# Patient Record
Sex: FEMALE | Race: WHITE | NOT HISPANIC OR LATINO | Employment: UNEMPLOYED | URBAN - METROPOLITAN AREA
[De-identification: names, ages, dates, MRNs, and addresses within clinical notes are randomized per-mention and may not be internally consistent; named-entity substitution may affect disease eponyms.]

---

## 2022-01-31 ENCOUNTER — HOSPITAL ENCOUNTER (EMERGENCY)
Facility: HOSPITAL | Age: 57
Discharge: HOME | End: 2022-01-31
Attending: EMERGENCY MEDICINE | Admitting: EMERGENCY MEDICINE
Payer: COMMERCIAL

## 2022-01-31 ENCOUNTER — APPOINTMENT (EMERGENCY)
Dept: RADIOLOGY | Facility: HOSPITAL | Age: 57
End: 2022-01-31
Payer: COMMERCIAL

## 2022-01-31 VITALS
RESPIRATION RATE: 16 BRPM | TEMPERATURE: 97.9 F | OXYGEN SATURATION: 97 % | SYSTOLIC BLOOD PRESSURE: 142 MMHG | HEART RATE: 99 BPM | DIASTOLIC BLOOD PRESSURE: 73 MMHG

## 2022-01-31 DIAGNOSIS — M79.642 LEFT HAND PAIN: ICD-10-CM

## 2022-01-31 DIAGNOSIS — M25.511 RIGHT SHOULDER PAIN: Primary | ICD-10-CM

## 2022-01-31 PROCEDURE — 99284 EMERGENCY DEPT VISIT MOD MDM: CPT | Performed by: EMERGENCY MEDICINE

## 2022-01-31 PROCEDURE — 73130 X-RAY EXAM OF HAND: CPT

## 2022-01-31 PROCEDURE — 73030 X-RAY EXAM OF SHOULDER: CPT

## 2022-01-31 PROCEDURE — 99283 EMERGENCY DEPT VISIT LOW MDM: CPT

## 2022-01-31 RX ORDER — LIDOCAINE 50 MG/G
1 PATCH TOPICAL ONCE
Status: DISCONTINUED | OUTPATIENT
Start: 2022-01-31 | End: 2022-01-31 | Stop reason: HOSPADM

## 2022-01-31 RX ORDER — ACETAMINOPHEN 325 MG/1
975 TABLET ORAL ONCE
Status: DISCONTINUED | OUTPATIENT
Start: 2022-01-31 | End: 2022-01-31 | Stop reason: HOSPADM

## 2022-01-31 NOTE — ED NOTES
This Rn attempted to verify patient's allergies at this time, patient states "toradol and gabapentin are the important ones, but that she is still allergic to three muscle relaxers " Patient states she has about 15 different allergies but does not have her list with her at this time to confirm        Clotilde Bush RN  01/31/22 2073

## 2022-01-31 NOTE — ED PROVIDER NOTES
History  Chief Complaint   Patient presents with    Arm Pain     pt fell down steps at home 1 5 weeks ago where patient caught herself on stair chair, pt c/o R shoulder pain with pain radiating up into the neck  HPI  56 yo F presents with R shoulder pain that started a week and a half ago when she caught herself from falling down stairs by grabbing stair gabriella  Since then she has been having pain in her R shoulder that is aching and moderate, constant  Not improved with her home muscle relaxants  Also complaining of pain in her left thumb/hand after the injury  No weakness or numbness  None       Past Medical History:   Diagnosis Date    Asthma     Hypertension     Stroke [facility])        Past Surgical History:   Procedure Laterality Date    CERVICAL FUSION      c4-6    CHOLECYSTECTOMY         History reviewed  No pertinent family history  I have reviewed and agree with the history as documented  E-Cigarette/Vaping     E-Cigarette/Vaping Substances     Social History     Tobacco Use    Smoking status: Current Every Day Smoker     Packs/day: 1 00    Smokeless tobacco: Never Used   Substance Use Topics    Alcohol use: Yes    Drug use: Not on file       Review of Systems   Constitutional: Negative for chills and fever  HENT: Negative for dental problem and ear pain  Eyes: Negative for pain and redness  Respiratory: Negative for cough and shortness of breath  Cardiovascular: Negative for chest pain and palpitations  Gastrointestinal: Negative for abdominal pain and nausea  Endocrine: Negative for polydipsia and polyphagia  Genitourinary: Negative for dysuria and frequency  Musculoskeletal: Positive for arthralgias  Negative for joint swelling  Skin: Negative for color change and rash  Neurological: Negative for dizziness and headaches  Psychiatric/Behavioral: Negative for behavioral problems and confusion  All other systems reviewed and are negative        Physical Exam  Physical Exam  Vitals and nursing note reviewed  Constitutional:       General: She is not in acute distress  HENT:      Head: Normocephalic and atraumatic  Right Ear: External ear normal       Left Ear: External ear normal       Nose: Nose normal    Eyes:      General: No scleral icterus  Cardiovascular:      Rate and Rhythm: Normal rate  Pulmonary:      Effort: Pulmonary effort is normal  No respiratory distress  Abdominal:      General: There is no distension  Musculoskeletal:         General: No deformity  Normal range of motion  Comments: R shoulder mild TTP, decreased ROM due to pain, n/v intact distally  L hand no TTP, normal ROM   Skin:     Findings: No rash  Neurological:      General: No focal deficit present  Mental Status: She is alert  Gait: Gait normal    Psychiatric:         Mood and Affect: Mood normal          Vital Signs  ED Triage Vitals   Temperature Pulse Respirations Blood Pressure SpO2   01/31/22 1203 01/31/22 1159 01/31/22 1159 01/31/22 1159 01/31/22 1159   97 9 °F (36 6 °C) 99 16 142/73 97 %      Temp Source Heart Rate Source Patient Position - Orthostatic VS BP Location FiO2 (%)   01/31/22 1203 01/31/22 1159 01/31/22 1159 01/31/22 1159 --   Oral Monitor Sitting Left arm       Pain Score       --                  Vitals:    01/31/22 1159   BP: 142/73   Pulse: 99   Patient Position - Orthostatic VS: Sitting         Visual Acuity      ED Medications  Medications   acetaminophen (TYLENOL) tablet 975 mg (975 mg Oral Not Given 1/31/22 1232)   lidocaine (LIDODERM) 5 % patch 1 patch (1 patch Topical Not Given 1/31/22 1232)       Diagnostic Studies  Results Reviewed     None                 XR shoulder 2+ views RIGHT   Final Result by Jeyson Arcos MD (01/31 1342)      No acute osseous abnormality  Workstation performed: [id number]         XR hand 3+ views LEFT   Final Result by Yaneth Menon MD (01/31 1403)      No acute osseous abnormality  Workstation performed: [id number]                    Procedures  Procedures         ED Course                               SBIRT 22yo+      Most Recent Value   SBIRT (24 yo +)    In order to provide better care to our patients, we are screening all of our patients for alcohol and drug use  Would it be okay to ask you these screening questions? Yes Filed at: 01/31/2022 1206   Initial Alcohol Screen: US AUDIT-C     1  How often do you have a drink containing alcohol? 0 Filed at: 01/31/2022 1206   2  How many drinks containing alcohol do you have on a typical day you are drinking? 0 Filed at: 01/31/2022 1206   3a  Male UNDER 65: How often do you have five or more drinks on one occasion? 0 Filed at: 01/31/2022 1206   3b  FEMALE Any Age, or MALE 65+: How often do you have 4 or more drinks on one occassion? 0 Filed at: 01/31/2022 1206   Audit-C Score 0 Filed at: 01/31/2022 1206   LINDA: How many times in the past year have you    Used an illegal drug or used a prescription medication for non-medical reasons? Never Filed at: 01/31/2022 1206                    MDM  Patient presents with R shoulder pain and hand pain after fall  No acute abnormality on XRs per my read  Patient declines pain medication in the ED  Have given sling and will refer to orthopedics  Disposition  Final diagnoses:   Right shoulder pain   Left hand pain     Time reflects when diagnosis was documented in both MDM as applicable and the Disposition within this note     Time User Action Codes Description Comment    1/31/2022 12:47 PM Jake Cardozo Right shoulder pain     1/31/2022 12:48 PM [facility] Add [C31 [id number]] Left hand pain       ED Disposition     ED Disposition Condition Date/Time Comment    Discharge Stable Mon Jan 31, 2022 12:55 PM Juliocesar [id number] discharge to home/self care              Follow-up Information     Follow up With Specialties Details Why Contact Info Additional [facility] Call  for orthopedic follow up [phone]  [address] [facility], [address], [place], [place], [address] [facility]          There are no discharge medications for this patient  No discharge procedures on file      PDMP Review     None          ED Provider  Electronically Signed by           Kojo White MD  01/31/22 9253

## 2022-01-31 NOTE — ED NOTES
Provider made aware that patient refused pain medication modalities stating "I take that at home  That shit don't work for me   I don't want that shit "      Jeramy Melgar RN  01/31/22 4529